# Patient Record
Sex: FEMALE | Race: WHITE | NOT HISPANIC OR LATINO | Employment: OTHER | URBAN - METROPOLITAN AREA
[De-identification: names, ages, dates, MRNs, and addresses within clinical notes are randomized per-mention and may not be internally consistent; named-entity substitution may affect disease eponyms.]

---

## 2023-04-17 ENCOUNTER — OFFICE VISIT (OUTPATIENT)
Dept: PRIMARY CARE | Facility: CLINIC | Age: 68
End: 2023-04-17
Payer: MEDICARE

## 2023-04-17 VITALS
BODY MASS INDEX: 43.4 KG/M2 | DIASTOLIC BLOOD PRESSURE: 72 MMHG | HEART RATE: 80 BPM | WEIGHT: 293 LBS | TEMPERATURE: 97 F | HEIGHT: 69 IN | SYSTOLIC BLOOD PRESSURE: 138 MMHG

## 2023-04-17 DIAGNOSIS — I83.892 VENOUS STASIS ULCER OF LEFT LOWER LEG WITH EDEMA OF LEFT LOWER LEG (MULTI): ICD-10-CM

## 2023-04-17 DIAGNOSIS — M06.4 INFLAMMATORY POLYARTHROPATHY (MULTI): ICD-10-CM

## 2023-04-17 DIAGNOSIS — I83.029 VENOUS STASIS ULCER OF LEFT LOWER LEG WITH EDEMA OF LEFT LOWER LEG (MULTI): ICD-10-CM

## 2023-04-17 DIAGNOSIS — L30.9 DERMATITIS: Primary | ICD-10-CM

## 2023-04-17 DIAGNOSIS — L97.929 VENOUS STASIS ULCER OF LEFT LOWER LEG WITH EDEMA OF LEFT LOWER LEG (MULTI): ICD-10-CM

## 2023-04-17 DIAGNOSIS — E66.01 OBESITY, MORBID (MULTI): ICD-10-CM

## 2023-04-17 DIAGNOSIS — R60.0 VENOUS STASIS ULCER OF LEFT LOWER LEG WITH EDEMA OF LEFT LOWER LEG (MULTI): ICD-10-CM

## 2023-04-17 DIAGNOSIS — D68.51 FACTOR V LEIDEN (MULTI): ICD-10-CM

## 2023-04-17 PROBLEM — K43.9 HERNIA OF ANTERIOR ABDOMINAL WALL: Status: RESOLVED | Noted: 2023-04-17 | Resolved: 2023-04-17

## 2023-04-17 PROBLEM — S69.90XA HAND INJURY: Status: RESOLVED | Noted: 2023-04-17 | Resolved: 2023-04-17

## 2023-04-17 PROBLEM — L03.90 CELLULITIS: Status: RESOLVED | Noted: 2023-04-17 | Resolved: 2023-04-17

## 2023-04-17 PROBLEM — K57.32 DIVERTICULITIS, COLON: Status: RESOLVED | Noted: 2023-04-17 | Resolved: 2023-04-17

## 2023-04-17 PROBLEM — R73.03 PREDIABETES: Status: ACTIVE | Noted: 2023-04-17

## 2023-04-17 PROBLEM — I82.409 RECURRENT DEEP VENOUS THROMBOSIS (MULTI): Status: ACTIVE | Noted: 2023-04-17

## 2023-04-17 PROBLEM — R73.01 FASTING HYPERGLYCEMIA: Status: ACTIVE | Noted: 2023-04-17

## 2023-04-17 PROBLEM — R03.0 ELEVATED BLOOD PRESSURE READING: Status: ACTIVE | Noted: 2023-04-17

## 2023-04-17 PROBLEM — E78.00 HYPERCHOLESTEROLEMIA: Status: RESOLVED | Noted: 2023-04-17 | Resolved: 2023-04-17

## 2023-04-17 PROBLEM — E79.0 HYPERURICEMIA: Status: RESOLVED | Noted: 2023-04-17 | Resolved: 2023-04-17

## 2023-04-17 PROBLEM — M15.0 PRIMARY OSTEOARTHRITIS INVOLVING MULTIPLE JOINTS: Status: ACTIVE | Noted: 2023-04-17

## 2023-04-17 PROBLEM — J40 BRONCHITIS: Status: RESOLVED | Noted: 2023-04-17 | Resolved: 2023-04-17

## 2023-04-17 PROBLEM — E55.9 VITAMIN D DEFICIENCY: Status: ACTIVE | Noted: 2023-04-17

## 2023-04-17 PROBLEM — M25.50 JOINT PAIN: Status: ACTIVE | Noted: 2023-04-17

## 2023-04-17 PROBLEM — M12.9 ARTHROPATHY: Status: RESOLVED | Noted: 2023-04-17 | Resolved: 2023-04-17

## 2023-04-17 PROBLEM — R10.9 ABDOMINAL PAIN: Status: RESOLVED | Noted: 2023-04-17 | Resolved: 2023-04-17

## 2023-04-17 PROBLEM — R35.0 URINE FREQUENCY: Status: RESOLVED | Noted: 2023-04-17 | Resolved: 2023-04-17

## 2023-04-17 PROBLEM — K57.90 DIVERTICULOSIS: Status: RESOLVED | Noted: 2023-04-17 | Resolved: 2023-04-17

## 2023-04-17 PROBLEM — C44.320 SQUAMOUS CELL CARCINOMA OF FACE: Status: ACTIVE | Noted: 2023-04-17

## 2023-04-17 PROBLEM — M15.9 PRIMARY OSTEOARTHRITIS INVOLVING MULTIPLE JOINTS: Status: ACTIVE | Noted: 2023-04-17

## 2023-04-17 PROBLEM — K46.9 ABDOMINAL HERNIA: Status: RESOLVED | Noted: 2023-04-17 | Resolved: 2023-04-17

## 2023-04-17 PROBLEM — E66.9 OBESITY: Status: ACTIVE | Noted: 2023-04-17

## 2023-04-17 PROBLEM — U07.1 COVID-19: Status: RESOLVED | Noted: 2023-04-17 | Resolved: 2023-04-17

## 2023-04-17 PROCEDURE — 4004F PT TOBACCO SCREEN RCVD TLK: CPT | Performed by: FAMILY MEDICINE

## 2023-04-17 PROCEDURE — 99213 OFFICE O/P EST LOW 20 MIN: CPT | Performed by: FAMILY MEDICINE

## 2023-04-17 PROCEDURE — 1159F MED LIST DOCD IN RCRD: CPT | Performed by: FAMILY MEDICINE

## 2023-04-17 PROCEDURE — 1160F RVW MEDS BY RX/DR IN RCRD: CPT | Performed by: FAMILY MEDICINE

## 2023-04-17 RX ORDER — ALLOPURINOL 300 MG/1
1 TABLET ORAL DAILY
COMMUNITY
Start: 2021-09-07 | End: 2023-04-17 | Stop reason: ALTCHOICE

## 2023-04-17 RX ORDER — TRIAMCINOLONE ACETONIDE 1 MG/G
CREAM TOPICAL 2 TIMES DAILY
Qty: 15 G | Refills: 1 | Status: SHIPPED | OUTPATIENT
Start: 2023-04-17

## 2023-04-17 RX ORDER — MELOXICAM 7.5 MG/1
1 TABLET ORAL DAILY
COMMUNITY
Start: 2022-07-19

## 2023-04-17 RX ORDER — WARFARIN SODIUM 5 MG/1
1 TABLET ORAL DAILY
COMMUNITY
Start: 2014-10-02

## 2023-04-17 ASSESSMENT — PATIENT HEALTH QUESTIONNAIRE - PHQ9
1. LITTLE INTEREST OR PLEASURE IN DOING THINGS: NOT AT ALL
SUM OF ALL RESPONSES TO PHQ9 QUESTIONS 1 AND 2: 0
2. FEELING DOWN, DEPRESSED OR HOPELESS: NOT AT ALL

## 2023-04-17 NOTE — PROGRESS NOTES
"Subjective   Patient ID: Becky Ribera is a 67 y.o. female who presents for Rash (Itchy rash on her leg that started Saturday.  Hx of cellulitis and dermatitis.).    HPI 66 yo female presents co right calf itching since sat  No known bites  No warmth or sores or ulcerations  No fevers  Tried an old hydrocortisone crm    hx of eczema    had been seeing  wound clinic for venous stasis ucler of left lower leg and hx of lymphedema but hasnt seen for over a yr and left leg seems ok.     She is nervous bc she did have to be on a PICC line in past from cellulitis    hx of several DVTs in the past in left leg  hx of factor V leiden    is on coumadin and gets managed through coumadin clinic at [facility].     +tob    Due for fu  Has been caring for her sick mom  Denies any chest pains or palps or sob    Review of Systems  ROS as stated in HPI  Objective   /72   Pulse 80   Temp 36.1 °C (97 °F)   Ht 1.753 m (5' 9\")   Wt 150 kg (330 lb)   BMI 48.73 kg/m²     Physical Exam  Constitutional: A&O; NAD  Ext:  right posterior calf with small area of erythema and dryness;+chronic LE edema; no calf tenderness; no warmth; no open sores or ulcerations. No streaking    Psych: Judgment, orientation, mood, affect, insight wnl   Assessment/Plan   Problem List Items Addressed This Visit          Musculoskeletal    Venous stasis ulcer of left lower leg with edema of left lower leg (CMS/HCC)    Inflammatory polyarthropathy (CMS/HCC)       Endocrine/Metabolic    Obesity, morbid (CMS/HCC)       Hematologic    Factor V Leiden (CMS/HCC)    Relevant Medications    warfarin (Coumadin) 5 mg tablet     Other Visit Diagnoses       Dermatitis    -  Primary    Relevant Medications    triamcinolone (Kenalog) 0.1 % cream          Try rx triamcinolone crm prn  Keep clean  Watch for any redness, warmth, streaking, fevers or chills  Fu for wellness visit and labs.     "

## 2023-04-21 ENCOUNTER — PATIENT OUTREACH (OUTPATIENT)
Dept: CARE COORDINATION | Facility: CLINIC | Age: 68
End: 2023-04-21

## 2023-08-30 ENCOUNTER — TELEPHONE (OUTPATIENT)
Dept: PRIMARY CARE | Facility: CLINIC | Age: 68
End: 2023-08-30
Payer: MEDICARE

## 2023-09-14 ENCOUNTER — TELEPHONE (OUTPATIENT)
Dept: PRIMARY CARE | Facility: CLINIC | Age: 68
End: 2023-09-14
Payer: MEDICARE

## 2023-09-14 NOTE — TELEPHONE ENCOUNTER
Pt called and said she had a tick and went to the wound clinic and they prescribed her doxycline.  She said if something bothers her on it she will call.  I also informed her she is due for her mcw and she stated she got the vm and will call when she wants to schedule her appt.

## 2023-10-11 DIAGNOSIS — E79.0 URICACIDEMIA: ICD-10-CM

## 2023-10-11 RX ORDER — ALLOPURINOL 300 MG/1
300 TABLET ORAL DAILY
COMMUNITY
End: 2023-10-11 | Stop reason: SDUPTHER

## 2023-10-11 RX ORDER — MELOXICAM 7.5 MG/1
7.5 TABLET ORAL
COMMUNITY
Start: 2022-03-10

## 2023-10-11 RX ORDER — ALLOPURINOL 300 MG/1
300 TABLET ORAL DAILY
Qty: 30 TABLET | Refills: 0 | Status: SHIPPED | OUTPATIENT
Start: 2023-10-11 | End: 2023-11-07 | Stop reason: SDUPTHER

## 2023-10-11 RX ORDER — DOXYCYCLINE 100 MG/1
100 CAPSULE ORAL 2 TIMES DAILY
COMMUNITY
End: 2024-05-21 | Stop reason: WASHOUT

## 2023-11-06 PROBLEM — L97.309 CHRONIC ULCER OF ANKLE (MULTI): Status: ACTIVE | Noted: 2023-11-06

## 2023-11-06 PROBLEM — I87.2 VENOUS STASIS DERMATITIS: Status: ACTIVE | Noted: 2023-11-06

## 2023-11-06 PROBLEM — M10.9 GOUTY ARTHROPATHY: Status: ACTIVE | Noted: 2023-11-06

## 2023-11-06 PROBLEM — I87.009 POST-PHLEBITIC SYNDROME: Status: ACTIVE | Noted: 2023-11-06

## 2023-11-06 PROBLEM — I89.0 LYMPHEDEMA OF EXTREMITY: Status: ACTIVE | Noted: 2023-11-06

## 2023-11-06 PROBLEM — M06.9 RA (RHEUMATOID ARTHRITIS) (MULTI): Status: ACTIVE | Noted: 2023-11-06

## 2023-11-06 RX ORDER — WARFARIN 2.5 MG/1
2.5 TABLET ORAL
COMMUNITY

## 2023-11-06 RX ORDER — FLUTICASONE PROPIONATE 220 UG/1
1 AEROSOL, METERED RESPIRATORY (INHALATION) 2 TIMES DAILY PRN
COMMUNITY
Start: 2019-08-14

## 2023-11-06 RX ORDER — BETAMETHASONE DIPROPIONATE 0.5 MG/G
OINTMENT TOPICAL NIGHTLY
COMMUNITY
Start: 2018-12-17

## 2023-11-06 RX ORDER — BETAMETHASONE DIPROPIONATE 0.5 MG/G
OINTMENT, AUGMENTED TOPICAL 2 TIMES DAILY
COMMUNITY
Start: 2022-12-12

## 2023-11-06 RX ORDER — CYCLOBENZAPRINE HCL 10 MG
1 TABLET ORAL 2 TIMES DAILY PRN
COMMUNITY
Start: 2020-10-23

## 2023-11-06 RX ORDER — FLUTICASONE PROPIONATE 50 MCG
1 SPRAY, SUSPENSION (ML) NASAL DAILY
COMMUNITY
Start: 2016-12-08

## 2023-11-06 RX ORDER — WARFARIN 10 MG/1
1 TABLET ORAL 3 TIMES WEEKLY
COMMUNITY
Start: 2019-08-14

## 2023-11-06 RX ORDER — WARFARIN SODIUM 5 MG/1
2.5 TABLET ORAL
COMMUNITY
Start: 2019-08-14

## 2023-11-06 RX ORDER — ALBUTEROL SULFATE 90 UG/1
AEROSOL, METERED RESPIRATORY (INHALATION)
COMMUNITY
Start: 2016-12-08

## 2023-11-06 RX ORDER — CIPROFLOXACIN 500 MG/1
500 TABLET ORAL EVERY 12 HOURS
COMMUNITY
Start: 2020-10-13 | End: 2024-05-21 | Stop reason: WASHOUT

## 2023-11-06 RX ORDER — ERGOCALCIFEROL 1.25 MG/1
1 CAPSULE ORAL WEEKLY
COMMUNITY
Start: 2021-04-08 | End: 2024-05-21 | Stop reason: WASHOUT

## 2023-11-06 RX ORDER — ACETAMINOPHEN AND CODEINE PHOSPHATE 300; 30 MG/1; MG/1
1 TABLET ORAL EVERY 6 HOURS
COMMUNITY
Start: 2020-08-27 | End: 2024-05-21 | Stop reason: WASHOUT

## 2023-11-07 ENCOUNTER — LAB (OUTPATIENT)
Dept: LAB | Facility: LAB | Age: 68
End: 2023-11-07
Payer: MEDICARE

## 2023-11-07 ENCOUNTER — OFFICE VISIT (OUTPATIENT)
Dept: RHEUMATOLOGY | Facility: CLINIC | Age: 68
End: 2023-11-07
Payer: MEDICARE

## 2023-11-07 VITALS
HEIGHT: 69 IN | DIASTOLIC BLOOD PRESSURE: 76 MMHG | WEIGHT: 293 LBS | BODY MASS INDEX: 43.4 KG/M2 | HEART RATE: 66 BPM | SYSTOLIC BLOOD PRESSURE: 160 MMHG

## 2023-11-07 DIAGNOSIS — M10.9 GOUTY ARTHROPATHY: Primary | ICD-10-CM

## 2023-11-07 DIAGNOSIS — M15.9 PRIMARY OSTEOARTHRITIS INVOLVING MULTIPLE JOINTS: ICD-10-CM

## 2023-11-07 DIAGNOSIS — M10.9 GOUTY ARTHROPATHY: ICD-10-CM

## 2023-11-07 PROCEDURE — 4004F PT TOBACCO SCREEN RCVD TLK: CPT | Performed by: INTERNAL MEDICINE

## 2023-11-07 PROCEDURE — 84550 ASSAY OF BLOOD/URIC ACID: CPT

## 2023-11-07 PROCEDURE — 1159F MED LIST DOCD IN RCRD: CPT | Performed by: INTERNAL MEDICINE

## 2023-11-07 PROCEDURE — 99213 OFFICE O/P EST LOW 20 MIN: CPT | Performed by: INTERNAL MEDICINE

## 2023-11-07 PROCEDURE — 80048 BASIC METABOLIC PNL TOTAL CA: CPT

## 2023-11-07 PROCEDURE — 36415 COLL VENOUS BLD VENIPUNCTURE: CPT

## 2023-11-07 PROCEDURE — 1160F RVW MEDS BY RX/DR IN RCRD: CPT | Performed by: INTERNAL MEDICINE

## 2023-11-07 PROCEDURE — 1126F AMNT PAIN NOTED NONE PRSNT: CPT | Performed by: INTERNAL MEDICINE

## 2023-11-07 RX ORDER — ALLOPURINOL 300 MG/1
300 TABLET ORAL DAILY
Qty: 90 TABLET | Refills: 1 | Status: SHIPPED | OUTPATIENT
Start: 2023-11-07 | End: 2024-02-05

## 2023-11-07 NOTE — PROGRESS NOTES
Follow-up Rheumatology Patient Visit    Chief Complaint:  Becky Ribera is a 68 y.o. female presenting today for Follow-up.    History of Presenting Problem:   67 y/o female with prior diagnosis of RA present for evaluation. She is on Coumadin due to Factor 5 Leiden, She recalls that she initially though to have a connective tissue disease and then diagnosis with RA. She report she saw 3 different rheumatologist who ended up treating her with Plaquenil and then MTX 3 pills weekly, she report she decided to stop medication as she did not feel the medication did any good on them and she was feeling well.    Today she report her joints have been doing pretty good since being on the allopurinol that she only takes meloxicam as needed very rarely she recently lost her mother and is going to going through bereavement counseling.  She does admit to smoking we will also work on quitting after she gets through the recent stress of losing her mother and dealing with estate planning    Problem List:   Patient Active Problem List   Diagnosis    Factor 5 Leiden mutation, heterozygous (CMS/HCC)    Prediabetes    Joint pain    Obesity, morbid (CMS/HCC)    Fasting hyperglycemia    Elevated blood pressure reading    Recurrent deep venous thrombosis (CMS/HCC)    Primary osteoarthritis involving multiple joints    Squamous cell carcinoma of face    Vitamin D deficiency    Venous stasis ulcer of left lower leg with edema of left lower leg (CMS/HCC)    Inflammatory polyarthropathy (CMS/HCC)    Chronic ulcer of ankle (CMS/HCC)    Gouty arthropathy    Lymphedema of extremity    Post-phlebitic syndrome    Primary localized osteoarthrosis, lower leg    VTE (venous thromboembolism)    Venous stasis dermatitis    Activated protein C resistance (CMS/HCC)    Homozygous Factor V Leiden mutation (CMS/HCC)    RA (rheumatoid arthritis) (CMS/HCC)       Past Medical History:   Past Medical History:   Diagnosis Date    Abdominal hernia 04/17/2023     Abdominal pain 04/17/2023    Arthropathy 04/17/2023    Cellulitis 04/17/2023    COVID-19 04/17/2023    Dermatitis, eczematoid 04/17/2023    Dislocation of jaw, unspecified side, initial encounter     Dislocation, jaw    Diverticulitis, colon 04/17/2023    Diverticulosis 04/17/2023    Encounter for immunization 12/10/2015    Need for prophylactic vaccination and inoculation against influenza    Encounter for immunization 12/10/2015    Need for vaccination for pneumococcus    Encounter for screening for malignant neoplasm of colon 10/04/2021    Screening for colon cancer    Hand injury 04/17/2023    Hernia of anterior abdominal wall 04/17/2023    Hyperuricemia 04/17/2023    Long term (current) use of antimetabolite agent 02/04/2015    Long term methotrexate user    Long term (current) use of unspecified immunomodulators and immunosuppressants 07/06/2014    Long-term use of immunosuppressant medication    Personal history of (healed) traumatic fracture     History of fracture of ankle    Personal history of (healed) traumatic fracture     History of fracture of finger    Personal history of diseases of the blood and blood-forming organs and certain disorders involving the immune mechanism     History of anemia    Personal history of diseases of the blood and blood-forming organs and certain disorders involving the immune mechanism     History of factor V Leiden mutation    Personal history of diseases of the skin and subcutaneous tissue     History of rosacea    Personal history of other diseases of the digestive system 06/14/2016    History of colitis    Personal history of other diseases of the musculoskeletal system and connective tissue 04/08/2014    History of osteoarthritis    Personal history of other diseases of the nervous system and sense organs 09/23/2014    History of otitis media    Personal history of other diseases of the respiratory system     History of pleural effusion    Personal history of other  diseases of the respiratory system 04/08/2014    History of acute bronchitis    Personal history of pneumonia (recurrent)     History of pneumonia    Rash and other nonspecific skin eruption 04/10/2014    Rash    Urine frequency 04/17/2023       Surgical History:   Past Surgical History:   Procedure Laterality Date    CARPAL TUNNEL RELEASE  12/10/2015    Neuroplasty Decompression Median Nerve At Carpal Tunnel    OTHER SURGICAL HISTORY  12/10/2015    Wrist Excision Of Ganglion    OTHER SURGICAL HISTORY  02/04/2021    Lung surgery    OTHER SURGICAL HISTORY  02/10/2014    Chest Tube Insertion With Chemical Pleurodesis (Non-chemotherapeutic)    OTHER SURGICAL HISTORY  01/17/2014    Chemosurgery (Mohs Micrographic Technique)    TONSILLECTOMY  12/10/2015    Tonsillectomy With Adenoidectomy        Allergies:   Allergies   Allergen Reactions    Adhesive Tape-Silicones Hives and Rash     form of cephalosporin    Cefuroxime Sodium Hives and Rash     form of cephalosporin    Cefadroxil Unknown    Bacitracin Rash    Cefuroxime Rash     All over body   All over body   All over body    All over body    All over body   All over body    Replaced free text allergyAdded as Codified    Celecoxib Hives and Rash    Retapamulin Rash    Rofecoxib Hives and Rash     VIOXX--HIVES    Replaced free text allergyAdded as Codified    Rosin Rash    Sulfa (Sulfonamide Antibiotics) Hives and Rash     Very sensitive       Medications:   Current Outpatient Medications:     acetaminophen-codeine (Tylenol w/ Codeine #3) 300-30 mg tablet, Take 1 tablet by mouth every 6 hours., Disp: , Rfl:     albuterol (ProAir HFA) 90 mcg/actuation inhaler, Inhale., Disp: , Rfl:     allopurinol (Zyloprim) 300 mg tablet, Take 1 tablet (300 mg) by mouth once daily., Disp: 90 tablet, Rfl: 1    betamethasone dipropionate (Diprosone) 0.05 % ointment, Apply topically once daily at bedtime. Apply to rash on hands as directed, Disp: , Rfl:     betamethasone, augmented,  "(Diprolene) 0.05 % ointment, Apply topically twice a day. Apply to affected area. APPLY TO RASH, Disp: , Rfl:     ciprofloxacin (Cipro) 500 mg tablet, Take 1 tablet (500 mg) by mouth every 12 hours., Disp: , Rfl:     cyclobenzaprine (Flexeril) 10 mg tablet, Take 1 tablet (10 mg) by mouth 2 times a day as needed for muscle spasms., Disp: , Rfl:     diclofenac sodium 1 % kit, Apply 2 g topically 3 times a day., Disp: , Rfl:     diphenhydrAMINE-acetaminophen 12.5-325 mg tablet, Take 1 tablet by mouth once daily at bedtime., Disp: , Rfl:     doxycycline (Vibramycin) 100 mg capsule, Take 1 capsule (100 mg) by mouth 2 times a day., Disp: , Rfl:     ergocalciferol (Vitamin D-2) 1.25 MG (93484 UT) capsule, Take 1 capsule (1,250 mcg) by mouth once a week., Disp: , Rfl:     fluticasone (Flonase) 50 mcg/actuation nasal spray, Administer 1 spray into each nostril once daily., Disp: , Rfl:     fluticasone (Flovent HFA) 220 mcg/actuation inhaler, Inhale 1 puff 2 times a day as needed., Disp: , Rfl:     meloxicam (Mobic) 7.5 mg tablet, Take 1 tablet (7.5 mg) by mouth once daily., Disp: , Rfl:     meloxicam (Mobic) 7.5 mg tablet, Take 1 tablet (7.5 mg) by mouth., Disp: , Rfl:     triamcinolone (Kenalog) 0.1 % cream, Apply topically 2 times a day. Apply to affected area 1-2 times daily as needed., Disp: 15 g, Rfl: 1    warfarin (Coumadin) 10 mg tablet, Take 1 tablet (10 mg) by mouth 3 times a week. M, W, F. Take at night., Disp: , Rfl:     warfarin (Coumadin) 2.5 mg tablet, Take 1 tablet (2.5 mg) by mouth once daily.  Take with 10 mg tablet for total daily dose of 12.5 mg., Disp: , Rfl:     warfarin (Coumadin) 5 mg tablet, Take 1 tablet (5 mg) by mouth once daily., Disp: , Rfl:     warfarin (Coumadin) 5 mg tablet, Take 2.5 tablets (12.5 mg) by mouth. Browne,Tu,Th,Sa, TAKE AT NIGHT, Disp: , Rfl:       Objective   Physical Examination:    Visit Vitals  /76   Pulse 66   Ht 1.753 m (5' 9\")   Wt (!) 156 kg (343 lb)   BMI 50.65 kg/m² "   Smoking Status Every Day   BSA 2.76 m²        Gen: NAD, A&O x 3  HEENT: clear sclera and conjunctiva,     Musculoskeletal:   Neck; WNL, full ROM  Shoulder: WNL, full ROM  Elbow:WNL, full ROM, no effusion noted  Wrist and fingers;no active synovitis noted, Full ROM in the Wrist , Good fist and   Knees:  No effusions or crepitation, full ROM.  Hips; WNL, full ROM, Negative Daniel test  Ankle, Feet; WNL, full ROM    Skin: No rashes or lesions seen, no nail changes  Neuro: A&O x3, Normal Gait    Procedures :None    Orders:  Orders Placed This Encounter   Procedures    Uric Acid    Basic Metabolic Panel        Provider Impression:   Assessment/Plan   Encounter Diagnoses   Name Primary?    Gouty arthropathy Yes    Primary osteoarthritis involving multiple joints           68-year-old female with previous history of rheumatoid arthritis diagnosis treated with methotrexate 3 pills weekly presents for evaluation she apparently stopped treatment 8 years ago and was doing well until couple months ago after she recalls lifting a toddler and having pain in her neck shoulder and wrist area. After work up Determine she has underlying Gout arthropathy   -Continue with allopurinol 300 mg daily,   -Continue meloxicam 7.5 mg or Tylenol as needed  -Continue with with Vitamin D supplements  -We will check BMP and uric acid levels  -f/u in 6 months

## 2023-11-08 LAB
ANION GAP SERPL CALC-SCNC: 12 MMOL/L (ref 10–20)
BUN SERPL-MCNC: 12 MG/DL (ref 6–23)
CALCIUM SERPL-MCNC: 9.8 MG/DL (ref 8.6–10.6)
CHLORIDE SERPL-SCNC: 102 MMOL/L (ref 98–107)
CO2 SERPL-SCNC: 30 MMOL/L (ref 21–32)
CREAT SERPL-MCNC: 0.84 MG/DL (ref 0.5–1.05)
GFR SERPL CREATININE-BSD FRML MDRD: 76 ML/MIN/1.73M*2
GLUCOSE SERPL-MCNC: 113 MG/DL (ref 74–99)
POTASSIUM SERPL-SCNC: 4.3 MMOL/L (ref 3.5–5.3)
SODIUM SERPL-SCNC: 140 MMOL/L (ref 136–145)
URATE SERPL-MCNC: 5.6 MG/DL (ref 2.3–6.7)

## 2023-11-20 ENCOUNTER — OFFICE VISIT (OUTPATIENT)
Dept: URGENT CARE | Facility: CLINIC | Age: 68
End: 2023-11-20
Payer: MEDICARE

## 2023-11-20 VITALS
HEIGHT: 68 IN | WEIGHT: 293 LBS | SYSTOLIC BLOOD PRESSURE: 174 MMHG | BODY MASS INDEX: 44.41 KG/M2 | TEMPERATURE: 98.3 F | HEART RATE: 73 BPM | OXYGEN SATURATION: 97 % | DIASTOLIC BLOOD PRESSURE: 95 MMHG | RESPIRATION RATE: 18 BRPM

## 2023-11-20 DIAGNOSIS — J01.90 ACUTE SINUSITIS, RECURRENCE NOT SPECIFIED, UNSPECIFIED LOCATION: Primary | ICD-10-CM

## 2023-11-20 PROCEDURE — 1160F RVW MEDS BY RX/DR IN RCRD: CPT | Performed by: PHYSICIAN ASSISTANT

## 2023-11-20 PROCEDURE — 1159F MED LIST DOCD IN RCRD: CPT | Performed by: PHYSICIAN ASSISTANT

## 2023-11-20 PROCEDURE — 4004F PT TOBACCO SCREEN RCVD TLK: CPT | Performed by: PHYSICIAN ASSISTANT

## 2023-11-20 PROCEDURE — 1125F AMNT PAIN NOTED PAIN PRSNT: CPT | Performed by: PHYSICIAN ASSISTANT

## 2023-11-20 PROCEDURE — 99203 OFFICE O/P NEW LOW 30 MIN: CPT | Performed by: PHYSICIAN ASSISTANT

## 2023-11-20 RX ORDER — DOXYCYCLINE 100 MG/1
100 CAPSULE ORAL 2 TIMES DAILY
Qty: 20 CAPSULE | Refills: 0 | Status: SHIPPED | OUTPATIENT
Start: 2023-11-20 | End: 2023-11-30

## 2023-11-20 RX ORDER — BENZONATATE 100 MG/1
100 CAPSULE ORAL 3 TIMES DAILY PRN
Qty: 30 CAPSULE | Refills: 0 | Status: SHIPPED | OUTPATIENT
Start: 2023-11-20 | End: 2024-05-21 | Stop reason: WASHOUT

## 2023-11-20 ASSESSMENT — PAIN SCALES - GENERAL: PAINLEVEL: 2

## 2023-11-21 PROBLEM — J01.90 ACUTE SINUSITIS: Status: ACTIVE | Noted: 2023-11-21

## 2023-11-21 ASSESSMENT — ENCOUNTER SYMPTOMS
COUGH: 1
SINUS PRESSURE: 1

## 2023-11-22 NOTE — PROGRESS NOTES
Subjective   Patient ID: Becky Ribera is a 68 y.o. female.    Patient is a 68-year-old female who complains of ongoing congestion, sinus pressure and cough that she has been experiencing for the past 2 weeks.  Patient reports no associated fever, chills or myalgia.  Patient has no history of asthma or COPD and does not smoke.      Cough    Sinusitis  Associated symptoms: congestion and cough      The following portions of the chart were reviewed this encounter and updated as appropriate:       Review of Systems   HENT:  Positive for congestion and sinus pressure.    Respiratory:  Positive for cough.    All other systems reviewed and are negative.    Objective   Physical Exam  Vitals and nursing note reviewed.   Constitutional:       Appearance: Normal appearance. She is normal weight.   HENT:      Head: Normocephalic and atraumatic.      Right Ear: Tympanic membrane, ear canal and external ear normal.      Left Ear: Tympanic membrane, ear canal and external ear normal.      Nose: Nose normal. No congestion or rhinorrhea.      Mouth/Throat:      Mouth: Mucous membranes are moist.      Pharynx: Oropharynx is clear. No oropharyngeal exudate or posterior oropharyngeal erythema.   Eyes:      Extraocular Movements: Extraocular movements intact.      Conjunctiva/sclera: Conjunctivae normal.      Pupils: Pupils are equal, round, and reactive to light.   Cardiovascular:      Rate and Rhythm: Normal rate and regular rhythm.      Pulses: Normal pulses.      Heart sounds: Normal heart sounds.   Pulmonary:      Effort: Pulmonary effort is normal. No respiratory distress.      Breath sounds: Normal breath sounds. No stridor. No wheezing, rhonchi or rales.   Musculoskeletal:      Cervical back: Normal range of motion and neck supple.   Skin:     General: Skin is warm and dry.      Capillary Refill: Capillary refill takes less than 2 seconds.   Neurological:      General: No focal deficit present.      Mental Status: She is  alert and oriented to person, place, and time.   Psychiatric:         Mood and Affect: Mood normal.         Behavior: Behavior normal.         Thought Content: Thought content normal.         Judgment: Judgment normal.     Assessment/Plan   Physical exam findings as noted above.  Patient has multiple antibiotic allergies, however she reports that she has taken doxycycline in the past with no adverse effects.  Patient was provided with prescriptions for doxycycline 100 mg and Tessalon 100 mg.  Supportive care instructions were discussed and the patient verbalizes good understanding of same.    CLINICAL IMPRESSION:  Acute Sinusitis    Diagnoses and all orders for this visit:  Acute sinusitis, recurrence not specified, unspecified location  -     benzonatate (Tessalon Perles) 100 mg capsule; Take 1 capsule (100 mg) by mouth 3 times a day as needed for cough.  -     doxycycline (Monodox) 100 mg capsule; Take 1 capsule (100 mg) by mouth 2 times a day for 10 days. Take with at least 8 ounces (large glass) of water, do not lie down for 30 minutes after

## 2024-04-28 ENCOUNTER — OFFICE VISIT (OUTPATIENT)
Dept: URGENT CARE | Facility: CLINIC | Age: 69
End: 2024-04-28
Payer: MEDICARE

## 2024-04-28 VITALS
HEART RATE: 84 BPM | SYSTOLIC BLOOD PRESSURE: 172 MMHG | DIASTOLIC BLOOD PRESSURE: 82 MMHG | RESPIRATION RATE: 20 BRPM | OXYGEN SATURATION: 93 % | TEMPERATURE: 97.1 F

## 2024-04-28 DIAGNOSIS — J01.90 ACUTE SINUSITIS, RECURRENCE NOT SPECIFIED, UNSPECIFIED LOCATION: Primary | ICD-10-CM

## 2024-04-28 PROCEDURE — 1125F AMNT PAIN NOTED PAIN PRSNT: CPT | Performed by: PHYSICIAN ASSISTANT

## 2024-04-28 PROCEDURE — 99213 OFFICE O/P EST LOW 20 MIN: CPT | Performed by: PHYSICIAN ASSISTANT

## 2024-04-28 RX ORDER — BENZONATATE 100 MG/1
100 CAPSULE ORAL 3 TIMES DAILY PRN
Qty: 30 CAPSULE | Refills: 0 | Status: SHIPPED | OUTPATIENT
Start: 2024-04-28 | End: 2024-05-08

## 2024-04-28 RX ORDER — AMOXICILLIN AND CLAVULANATE POTASSIUM 875; 125 MG/1; MG/1
1 TABLET, FILM COATED ORAL 2 TIMES DAILY
Qty: 20 TABLET | Refills: 0 | Status: SHIPPED | OUTPATIENT
Start: 2024-04-28 | End: 2024-05-08

## 2024-04-28 RX ORDER — PREDNISONE 20 MG/1
20 TABLET ORAL 2 TIMES DAILY
Qty: 6 TABLET | Refills: 0 | Status: SHIPPED | OUTPATIENT
Start: 2024-04-28 | End: 2024-05-01

## 2024-04-28 ASSESSMENT — PAIN SCALES - GENERAL: PAINLEVEL: 7

## 2024-04-28 ASSESSMENT — ENCOUNTER SYMPTOMS
SINUS COMPLAINT: 1
SINUS PRESSURE: 1
COUGH: 1
SINUS PAIN: 1

## 2024-04-28 NOTE — PROGRESS NOTES
Subjective   Patient ID: Becky Ribera is a 69 y.o. female.    Patient is a 69-year-old female complains of worsening congestion, sinus pressure, ear pain and cough that she has been experiencing for the past 1 week.  Patient reports increasing fullness and pressure to her bilateral ears.  Patient denies fever, chills or myalgia.  Patient denies history of asthma or COPD but does smoke.  Patient states that she has a significant history of both bronchitis and pneumonia.  Patient denies dyspnea or shortness of breath.      Sinus Problem  Associated symptoms: congestion, cough and ear pain    The following portions of the chart were reviewed this encounter and updated as appropriate:       Review of Systems   HENT:  Positive for congestion, ear pain, sinus pressure and sinus pain.    Respiratory:  Positive for cough.    All other systems reviewed and are negative.  Objective   Physical Exam  Vitals and nursing note reviewed.   Constitutional:       Appearance: Normal appearance. She is normal weight.   HENT:      Head: Normocephalic and atraumatic.      Right Ear: Tympanic membrane, ear canal and external ear normal.      Left Ear: Tympanic membrane, ear canal and external ear normal.      Nose: Nose normal. No congestion or rhinorrhea.      Mouth/Throat:      Mouth: Mucous membranes are moist.      Pharynx: Oropharynx is clear. No oropharyngeal exudate or posterior oropharyngeal erythema.   Eyes:      Extraocular Movements: Extraocular movements intact.      Conjunctiva/sclera: Conjunctivae normal.      Pupils: Pupils are equal, round, and reactive to light.   Cardiovascular:      Rate and Rhythm: Normal rate and regular rhythm.      Pulses: Normal pulses.      Heart sounds: Normal heart sounds.   Pulmonary:      Effort: Pulmonary effort is normal. No respiratory distress.      Breath sounds: Normal breath sounds. No stridor. No wheezing, rhonchi or rales.   Musculoskeletal:      Cervical back: Normal range of  motion and neck supple.   Skin:     General: Skin is warm and dry.      Capillary Refill: Capillary refill takes less than 2 seconds.   Neurological:      General: No focal deficit present.      Mental Status: She is alert and oriented to person, place, and time.   Psychiatric:         Mood and Affect: Mood normal.         Behavior: Behavior normal.         Thought Content: Thought content normal.         Judgment: Judgment normal.     Assessment/Plan   Physical exam findings as noted above.  Patient states that she can take amoxicillin and penicillin without difficulty even though she does have multiple cephalosporin allergies.  Patient was provided with prescriptions for Augmentin 875-125 mg, prednisone 20 mg and Tessalon 100 mg.  Supportive care instructions were discussed and the patient verbalizes good understanding of same.    CLINICAL IMPRESSION:  Acute Sinusitis    Diagnoses and all orders for this visit:  Acute sinusitis, recurrence not specified, unspecified location  -     amoxicillin-pot clavulanate (Augmentin) 875-125 mg tablet; Take 1 tablet by mouth 2 times a day for 10 days.  -     benzonatate (Tessalon Perles) 100 mg capsule; Take 1 capsule (100 mg) by mouth 3 times a day as needed for cough for up to 10 days.  -     predniSONE (Deltasone) 20 mg tablet; Take 1 tablet (20 mg) by mouth 2 times a day for 3 days.    Patient disposition: Home

## 2024-05-07 ENCOUNTER — APPOINTMENT (OUTPATIENT)
Dept: RHEUMATOLOGY | Facility: CLINIC | Age: 69
End: 2024-05-07
Payer: MEDICARE

## 2024-05-07 DIAGNOSIS — M10.9 GOUTY ARTHROPATHY: Primary | ICD-10-CM

## 2024-05-07 RX ORDER — ALLOPURINOL 300 MG/1
300 TABLET ORAL DAILY
Qty: 14 TABLET | Refills: 0 | Status: SHIPPED | OUTPATIENT
Start: 2024-05-07 | End: 2024-05-21 | Stop reason: SDUPTHER

## 2024-05-07 RX ORDER — ALLOPURINOL 300 MG/1
TABLET ORAL DAILY
COMMUNITY
Start: 2024-02-19 | End: 2024-05-07 | Stop reason: SDUPTHER

## 2024-05-21 ENCOUNTER — LAB (OUTPATIENT)
Dept: LAB | Facility: LAB | Age: 69
End: 2024-05-21
Payer: MEDICARE

## 2024-05-21 ENCOUNTER — OFFICE VISIT (OUTPATIENT)
Dept: RHEUMATOLOGY | Facility: CLINIC | Age: 69
End: 2024-05-21
Payer: MEDICARE

## 2024-05-21 VITALS
WEIGHT: 293 LBS | SYSTOLIC BLOOD PRESSURE: 137 MMHG | HEIGHT: 68 IN | BODY MASS INDEX: 44.41 KG/M2 | HEART RATE: 77 BPM | DIASTOLIC BLOOD PRESSURE: 74 MMHG

## 2024-05-21 DIAGNOSIS — M10.9 GOUTY ARTHROPATHY: ICD-10-CM

## 2024-05-21 DIAGNOSIS — E55.9 VITAMIN D DEFICIENCY: ICD-10-CM

## 2024-05-21 DIAGNOSIS — M15.9 PRIMARY OSTEOARTHRITIS INVOLVING MULTIPLE JOINTS: Primary | ICD-10-CM

## 2024-05-21 LAB
25(OH)D3 SERPL-MCNC: 20 NG/ML (ref 30–100)
ALBUMIN SERPL BCP-MCNC: 3.8 G/DL (ref 3.4–5)
ALP SERPL-CCNC: 61 U/L (ref 33–136)
ALT SERPL W P-5'-P-CCNC: 19 U/L (ref 7–45)
ANION GAP SERPL CALC-SCNC: 14 MMOL/L (ref 10–20)
AST SERPL W P-5'-P-CCNC: 18 U/L (ref 9–39)
BILIRUB SERPL-MCNC: 0.5 MG/DL (ref 0–1.2)
BUN SERPL-MCNC: 13 MG/DL (ref 6–23)
CALCIUM SERPL-MCNC: 9 MG/DL (ref 8.6–10.6)
CHLORIDE SERPL-SCNC: 104 MMOL/L (ref 98–107)
CO2 SERPL-SCNC: 26 MMOL/L (ref 21–32)
CREAT SERPL-MCNC: 0.77 MG/DL (ref 0.5–1.05)
EGFRCR SERPLBLD CKD-EPI 2021: 84 ML/MIN/1.73M*2
GLUCOSE SERPL-MCNC: 163 MG/DL (ref 74–99)
POTASSIUM SERPL-SCNC: 4.2 MMOL/L (ref 3.5–5.3)
PROT SERPL-MCNC: 7.1 G/DL (ref 6.4–8.2)
SODIUM SERPL-SCNC: 140 MMOL/L (ref 136–145)
URATE SERPL-MCNC: 5.1 MG/DL (ref 2.3–6.7)

## 2024-05-21 PROCEDURE — 36415 COLL VENOUS BLD VENIPUNCTURE: CPT

## 2024-05-21 PROCEDURE — 1126F AMNT PAIN NOTED NONE PRSNT: CPT | Performed by: INTERNAL MEDICINE

## 2024-05-21 PROCEDURE — 82306 VITAMIN D 25 HYDROXY: CPT

## 2024-05-21 PROCEDURE — 1159F MED LIST DOCD IN RCRD: CPT | Performed by: INTERNAL MEDICINE

## 2024-05-21 PROCEDURE — 1160F RVW MEDS BY RX/DR IN RCRD: CPT | Performed by: INTERNAL MEDICINE

## 2024-05-21 PROCEDURE — 99214 OFFICE O/P EST MOD 30 MIN: CPT | Performed by: INTERNAL MEDICINE

## 2024-05-21 PROCEDURE — 84550 ASSAY OF BLOOD/URIC ACID: CPT

## 2024-05-21 PROCEDURE — 80053 COMPREHEN METABOLIC PANEL: CPT

## 2024-05-21 RX ORDER — ALLOPURINOL 300 MG/1
300 TABLET ORAL DAILY
Qty: 90 TABLET | Refills: 1 | Status: SHIPPED | OUTPATIENT
Start: 2024-05-21 | End: 2024-08-19

## 2024-05-21 ASSESSMENT — PAIN SCALES - GENERAL: PAINLEVEL: 0-NO PAIN

## 2024-05-21 NOTE — PROGRESS NOTES
Follow-up Rheumatology Patient Visit    Chief Complaint:  Becky Ribera is a 69 y.o. female presenting today for 6 month fuv.    History of Presenting Problem:   68 y/o female with Gout present for evaluation. She is on Coumadin due to Factor 5 Leiden, She recalls that she initially though to have a connective tissue disease and then diagnosis with RA. She report she saw 3 different rheumatologist who ended up treating her with Plaquenil and then MTX 3 pills weekly, she report she decided to stop medication as she did not feel the medication did any good on them.  After she establish care here she was diagnosed with gout and has been doing well on this treatment  Today she report her joints have been doing pretty good since being on the allopurinol.  she has not needed to take meloxicam for a whole year.  Problem List:   Patient Active Problem List   Diagnosis    Factor 5 Leiden mutation, heterozygous (Multi)    Prediabetes    Joint pain    Obesity, morbid (Multi)    Fasting hyperglycemia    Elevated blood pressure reading    Recurrent deep venous thrombosis (Multi)    Primary osteoarthritis involving multiple joints    Squamous cell carcinoma of face    Vitamin D deficiency    Venous stasis ulcer of left lower leg with edema of left lower leg (Multi)    Inflammatory polyarthropathy (Multi)    Chronic ulcer of ankle (Multi)    Gouty arthropathy    Lymphedema of extremity    Post-phlebitic syndrome    Primary localized osteoarthrosis, lower leg    VTE (venous thromboembolism)    Venous stasis dermatitis    Activated protein C resistance (Multi)    Homozygous Factor V Leiden mutation (Multi)    RA (rheumatoid arthritis) (Multi)    Acute sinusitis       Past Medical History:   Past Medical History:   Diagnosis Date    Abdominal hernia 04/17/2023    Abdominal pain 04/17/2023    Arthropathy 04/17/2023    Cellulitis 04/17/2023    COVID-19 04/17/2023    Dermatitis, eczematoid 04/17/2023    Dislocation of jaw, unspecified  side, initial encounter     Dislocation, jaw    Diverticulitis, colon 04/17/2023    Diverticulosis 04/17/2023    Encounter for immunization 12/10/2015    Need for prophylactic vaccination and inoculation against influenza    Encounter for immunization 12/10/2015    Need for vaccination for pneumococcus    Encounter for screening for malignant neoplasm of colon 10/04/2021    Screening for colon cancer    Hand injury 04/17/2023    Hernia of anterior abdominal wall 04/17/2023    Hyperuricemia 04/17/2023    Long term (current) use of antimetabolite agent 02/04/2015    Long term methotrexate user    Long term (current) use of unspecified immunomodulators and immunosuppressants 07/06/2014    Long-term use of immunosuppressant medication    Personal history of (healed) traumatic fracture     History of fracture of ankle    Personal history of (healed) traumatic fracture     History of fracture of finger    Personal history of diseases of the blood and blood-forming organs and certain disorders involving the immune mechanism     History of anemia    Personal history of diseases of the blood and blood-forming organs and certain disorders involving the immune mechanism     History of factor V Leiden mutation    Personal history of diseases of the skin and subcutaneous tissue     History of rosacea    Personal history of other diseases of the digestive system 06/14/2016    History of colitis    Personal history of other diseases of the musculoskeletal system and connective tissue 04/08/2014    History of osteoarthritis    Personal history of other diseases of the nervous system and sense organs 09/23/2014    History of otitis media    Personal history of other diseases of the respiratory system     History of pleural effusion    Personal history of other diseases of the respiratory system 04/08/2014    History of acute bronchitis    Personal history of pneumonia (recurrent)     History of pneumonia    Rash and other  nonspecific skin eruption 04/10/2014    Rash    Urine frequency 04/17/2023       Surgical History:   Past Surgical History:   Procedure Laterality Date    CARPAL TUNNEL RELEASE  12/10/2015    Neuroplasty Decompression Median Nerve At Carpal Tunnel    OTHER SURGICAL HISTORY  12/10/2015    Wrist Excision Of Ganglion    OTHER SURGICAL HISTORY  02/04/2021    Lung surgery    OTHER SURGICAL HISTORY  02/10/2014    Chest Tube Insertion With Chemical Pleurodesis (Non-chemotherapeutic)    OTHER SURGICAL HISTORY  01/17/2014    Chemosurgery (Mohs Micrographic Technique)    TONSILLECTOMY  12/10/2015    Tonsillectomy With Adenoidectomy        Allergies:   Allergies   Allergen Reactions    Adhesive Tape-Silicones Hives and Rash     form of cephalosporin    Cefuroxime Sodium Hives and Rash     form of cephalosporin    Cefadroxil Unknown    Bacitracin Rash    Cefuroxime Rash     All over body   All over body   All over body    All over body    All over body   All over body    Replaced free text allergyAdded as Codified    Celecoxib Hives and Rash    Retapamulin Rash    Rofecoxib Hives and Rash     VIOXX--HIVES    Replaced free text allergyAdded as Codified    Rosin Rash    Sulfa (Sulfonamide Antibiotics) Hives and Rash     Very sensitive       Medications:   Current Outpatient Medications:     albuterol (ProAir HFA) 90 mcg/actuation inhaler, Inhale., Disp: , Rfl:     betamethasone dipropionate (Diprosone) 0.05 % ointment, Apply topically once daily at bedtime. Apply to rash on hands as directed, Disp: , Rfl:     diphenhydrAMINE-acetaminophen 12.5-325 mg tablet, Take 1 tablet by mouth once daily at bedtime., Disp: , Rfl:     fluticasone (Flonase) 50 mcg/actuation nasal spray, Administer 1 spray into each nostril once daily., Disp: , Rfl:     meloxicam (Mobic) 7.5 mg tablet, Take 1 tablet (7.5 mg) by mouth once daily., Disp: , Rfl:     warfarin (Coumadin) 10 mg tablet, Take 1 tablet (10 mg) by mouth 3 times a week. M, WVÍCTOR, sun  "Take at night., Disp: , Rfl:     warfarin (Coumadin) 5 mg tablet, Take 2.5 tablets (12.5 mg) by mouth. Tues, and thurs TAKE AT NIGHT, Disp: , Rfl:     allopurinol (Zyloprim) 300 mg tablet, Take 1 tablet (300 mg) by mouth once daily., Disp: 90 tablet, Rfl: 1    betamethasone, augmented, (Diprolene) 0.05 % ointment, Apply topically twice a day. Apply to affected area. APPLY TO RASH, Disp: , Rfl:     cyclobenzaprine (Flexeril) 10 mg tablet, Take 1 tablet (10 mg) by mouth 2 times a day as needed for muscle spasms., Disp: , Rfl:     fluticasone (Flovent HFA) 220 mcg/actuation inhaler, Inhale 1 puff 2 times a day as needed., Disp: , Rfl:     meloxicam (Mobic) 7.5 mg tablet, Take 1 tablet (7.5 mg) by mouth., Disp: , Rfl:     triamcinolone (Kenalog) 0.1 % cream, Apply topically 2 times a day. Apply to affected area 1-2 times daily as needed. (Patient not taking: Reported on 5/21/2024), Disp: 15 g, Rfl: 1    warfarin (Coumadin) 2.5 mg tablet, Take 1 tablet (2.5 mg) by mouth once daily.  Take with 10 mg tablet for total daily dose of 12.5 mg., Disp: , Rfl:     warfarin (Coumadin) 5 mg tablet, Take 1 tablet (5 mg) by mouth once daily., Disp: , Rfl:       Objective   Physical Examination:    Visit Vitals  /74 (BP Location: Left arm, Patient Position: Sitting)   Pulse 77   Ht 1.727 m (5' 8\")   Wt (!) 160 kg (352 lb)   BMI 53.52 kg/m²   Smoking Status Every Day   BSA 2.77 m²        Gen: NAD, A&O x 3  HEENT: clear sclera and conjunctiva,     Musculoskeletal:   Neck; WNL, full ROM  Shoulder: WNL, full ROM  Elbow:WNL, full ROM, no effusion noted  Wrist and fingers;no active synovitis noted, Full ROM in the Wrist , Good fist and   Knees:  No effusions or crepitation, full ROM.  Hips; WNL, full ROM, Negative Daniel test  Ankle, Feet; WNL, full ROM    Skin: No rashes or lesions seen, no nail changes  Neuro: A&O x3, Normal Gait    Procedures :None    Orders:  Orders Placed This Encounter   Procedures    Comprehensive " Metabolic Panel    Uric Acid    Vitamin D 25-Hydroxy,Total (for eval of Vitamin D levels)        Provider Impression:   Assessment/Plan   Encounter Diagnoses   Name Primary?    Gouty arthropathy     Primary osteoarthritis involving multiple joints Yes    Vitamin D deficiency        69-year-old female with gout arthropathy and osteoarthritis presents for follow-up.   -Continue with allopurinol 300 mg daily,   -Continue meloxicam 7.5 mg or Tylenol as needed  -Continue with with Vitamin D supplements  -We will check BMP and uric acid levels  -f/u in 6 months

## 2024-05-22 DIAGNOSIS — E55.9 VITAMIN D DEFICIENCY: Primary | ICD-10-CM

## 2024-05-22 RX ORDER — ERGOCALCIFEROL 1.25 MG/1
50000 CAPSULE ORAL
Qty: 12 CAPSULE | Refills: 0 | Status: SHIPPED | OUTPATIENT
Start: 2024-05-22

## 2024-05-22 NOTE — TELEPHONE ENCOUNTER
----- Message from Antonia Santiago DO sent at 5/22/2024 10:30 AM EDT -----  Please let patient know that  vitamin D is low, recommend they take high-dose vitamin D to improve their levels which will help immune system, bones and joints  Send 50,000 units weekly  Q: 12 R:0  When they finish high dose vitamin D to start taking 3000 units daily OTC

## 2024-06-10 ENCOUNTER — APPOINTMENT (OUTPATIENT)
Dept: PRIMARY CARE | Facility: CLINIC | Age: 69
End: 2024-06-10
Payer: MEDICARE

## 2024-08-21 ENCOUNTER — OFFICE VISIT (OUTPATIENT)
Dept: PRIMARY CARE | Facility: CLINIC | Age: 69
End: 2024-08-21
Payer: MEDICARE

## 2024-08-21 VITALS
SYSTOLIC BLOOD PRESSURE: 155 MMHG | BODY MASS INDEX: 44.41 KG/M2 | WEIGHT: 293 LBS | HEART RATE: 85 BPM | OXYGEN SATURATION: 97 % | HEIGHT: 68 IN | TEMPERATURE: 96.9 F | DIASTOLIC BLOOD PRESSURE: 86 MMHG

## 2024-08-21 DIAGNOSIS — H61.92 LESION OF EXTERNAL EAR, LEFT: Primary | ICD-10-CM

## 2024-08-21 DIAGNOSIS — L30.9 DERMATITIS: ICD-10-CM

## 2024-08-21 PROCEDURE — 99213 OFFICE O/P EST LOW 20 MIN: CPT | Performed by: NURSE PRACTITIONER

## 2024-08-21 PROCEDURE — 1160F RVW MEDS BY RX/DR IN RCRD: CPT | Performed by: NURSE PRACTITIONER

## 2024-08-21 PROCEDURE — 3008F BODY MASS INDEX DOCD: CPT | Performed by: NURSE PRACTITIONER

## 2024-08-21 PROCEDURE — 1159F MED LIST DOCD IN RCRD: CPT | Performed by: NURSE PRACTITIONER

## 2024-08-21 RX ORDER — TRIAMCINOLONE ACETONIDE 1 MG/G
CREAM TOPICAL 2 TIMES DAILY
Qty: 80 G | Refills: 1 | Status: SHIPPED | OUTPATIENT
Start: 2024-08-21

## 2024-08-21 RX ORDER — PREDNISONE 20 MG/1
TABLET ORAL
Qty: 20 TABLET | Refills: 0 | Status: SHIPPED | OUTPATIENT
Start: 2024-08-21

## 2024-08-21 RX ORDER — DOXYCYCLINE 100 MG/1
100 CAPSULE ORAL 2 TIMES DAILY
Qty: 20 CAPSULE | Refills: 0 | Status: SHIPPED | OUTPATIENT
Start: 2024-08-21 | End: 2024-08-31

## 2024-08-21 ASSESSMENT — PATIENT HEALTH QUESTIONNAIRE - PHQ9
2. FEELING DOWN, DEPRESSED OR HOPELESS: NOT AT ALL
SUM OF ALL RESPONSES TO PHQ9 QUESTIONS 1 AND 2: 0
1. LITTLE INTEREST OR PLEASURE IN DOING THINGS: NOT AT ALL

## 2024-08-21 ASSESSMENT — ENCOUNTER SYMPTOMS
SHORTNESS OF BREATH: 0
WHEEZING: 0
CONSTITUTIONAL NEGATIVE: 1

## 2024-08-21 NOTE — PATIENT INSTRUCTIONS
Zyrtec or claritin am  Benadryl night    Steroid cream  Steroid pills    Antibiotic for ear    Call if not starting to get better in 2-3d or not fully healed in 2wks.    Return for wellness appt    Check bp at home daily  Goal <140/<90  I will call you in 1wk to obtain bps      I will communicate with you via "[employer]" regarding messages and results. If you need help with this, you can call the support line at [phone].    IT WAS A PLEASURE TO SEE YOU TODAY. THANK YOU FOR CHOOSING US FOR YOUR HEALTHCARE NEEDS.

## 2024-08-21 NOTE — PROGRESS NOTES
Subjective   Patient ID: Becky Ribera is a 69 y.o. female who presents for Rash.  Last physical: due for awv    When did rash start? Saturday   Where is it located? Face, arms, ear      HPI  Rash on lf forehead, forearms (swelling lf forearm), lf chin, lf hand and finger x 4d  Lf ear with crusting top of ear [id number] bacitracin  Upper eyelid swelling bilat lf>rt  Itchy  Not painful  No discharge  No fever or chills  Selftxt: allergy med, ivy dry, bacitracin  No one else around pt with similar rash  No new products, foods, meds, otc meds, or pets    No care team member to display     Review of Systems   Constitutional: Negative.    Respiratory:  Negative for shortness of breath and wheezing.    Cardiovascular:  Negative for chest pain.   Skin:  Positive for rash.       Objective   Visit Vitals  /87   Pulse 85   Temp 36.1 °C (96.9 °F)      BP Readings from Last 3 Encounters:   08/21/24 169/87   05/21/24 137/74   04/28/24 172/82     Wt Readings from Last 3 Encounters:   08/21/24 (!) 158 kg (349 lb)   05/21/24 (!) 160 kg (352 lb)   11/20/23 (!) 154 kg (340 lb)       Physical Exam  Constitutional:       General: She is not in acute distress.     Appearance: Normal appearance.   Skin:     Findings: Rash (red raised rash on forearms, lf hand, lf forehead, lf jaw) present.      Comments: Swelling lf forearm and bilat upper eyelids  Lf top of ear with 7mm x 2cm raw red area with drainage. Unsure if PI or burn   Neurological:      Mental Status: She is alert.       Assessment/Plan   Diagnoses and all orders for this visit:  Lesion of external ear, left  -     doxycycline (Vibramycin) 100 mg capsule; Take 1 capsule (100 mg) by mouth 2 times a day for 10 days. Take with at least 8 ounces (large glass) of water, do not lie down for 30 minutes after  Dermatitis  -     predniSONE (Deltasone) 20 mg tablet; 3 tabs daily x3d then 2 tabs daily x 3d then 1 tab daily x 3d then 1/2 tab daily x 3d with food  -     triamcinolone  (Kenalog) 0.1 % cream; Apply topically 2 times a day. Use no longer than 2wks.  Other orders  -     Follow Up In Primary Care - Medicare Annual; Future

## 2024-08-29 ENCOUNTER — TELEPHONE (OUTPATIENT)
Dept: PRIMARY CARE | Facility: CLINIC | Age: 69
End: 2024-08-29
Payer: MEDICARE

## 2024-10-29 ENCOUNTER — APPOINTMENT (OUTPATIENT)
Dept: PRIMARY CARE | Facility: CLINIC | Age: 69
End: 2024-10-29
Payer: MEDICARE

## 2024-11-14 DIAGNOSIS — M10.9 GOUTY ARTHROPATHY: Primary | ICD-10-CM

## 2024-11-14 RX ORDER — ALLOPURINOL 300 MG/1
300 TABLET ORAL DAILY
Qty: 30 TABLET | Refills: 0 | Status: SHIPPED | OUTPATIENT
Start: 2024-11-14 | End: 2024-12-14

## 2024-11-20 ENCOUNTER — LAB (OUTPATIENT)
Dept: LAB | Facility: LAB | Age: 69
End: 2024-11-20
Payer: MEDICARE

## 2024-11-20 ENCOUNTER — APPOINTMENT (OUTPATIENT)
Dept: RHEUMATOLOGY | Facility: CLINIC | Age: 69
End: 2024-11-20
Payer: MEDICARE

## 2024-11-20 VITALS
HEART RATE: 76 BPM | SYSTOLIC BLOOD PRESSURE: 149 MMHG | DIASTOLIC BLOOD PRESSURE: 81 MMHG | BODY MASS INDEX: 52.72 KG/M2 | WEIGHT: 293 LBS

## 2024-11-20 DIAGNOSIS — M10.9 GOUTY ARTHROPATHY: ICD-10-CM

## 2024-11-20 DIAGNOSIS — E55.9 VITAMIN D DEFICIENCY: ICD-10-CM

## 2024-11-20 DIAGNOSIS — M15.0 PRIMARY OSTEOARTHRITIS INVOLVING MULTIPLE JOINTS: Primary | ICD-10-CM

## 2024-11-20 LAB
25(OH)D3 SERPL-MCNC: 37 NG/ML (ref 30–100)
ALBUMIN SERPL BCP-MCNC: 4.4 G/DL (ref 3.4–5)
ALP SERPL-CCNC: 56 U/L (ref 33–136)
ALT SERPL W P-5'-P-CCNC: 15 U/L (ref 7–45)
AST SERPL W P-5'-P-CCNC: 16 U/L (ref 9–39)
BILIRUB DIRECT SERPL-MCNC: 0.1 MG/DL (ref 0–0.3)
BILIRUB SERPL-MCNC: 0.7 MG/DL (ref 0–1.2)
CREAT SERPL-MCNC: 0.92 MG/DL (ref 0.5–1.05)
EGFRCR SERPLBLD CKD-EPI 2021: 68 ML/MIN/1.73M*2
PROT SERPL-MCNC: 7.1 G/DL (ref 6.4–8.2)
URATE SERPL-MCNC: 5.5 MG/DL (ref 2.3–6.7)

## 2024-11-20 PROCEDURE — 1159F MED LIST DOCD IN RCRD: CPT | Performed by: INTERNAL MEDICINE

## 2024-11-20 PROCEDURE — 82306 VITAMIN D 25 HYDROXY: CPT

## 2024-11-20 PROCEDURE — 84550 ASSAY OF BLOOD/URIC ACID: CPT

## 2024-11-20 PROCEDURE — 99214 OFFICE O/P EST MOD 30 MIN: CPT | Performed by: INTERNAL MEDICINE

## 2024-11-20 PROCEDURE — 82565 ASSAY OF CREATININE: CPT

## 2024-11-20 PROCEDURE — 1160F RVW MEDS BY RX/DR IN RCRD: CPT | Performed by: INTERNAL MEDICINE

## 2024-11-20 PROCEDURE — 36415 COLL VENOUS BLD VENIPUNCTURE: CPT

## 2024-11-20 PROCEDURE — 80076 HEPATIC FUNCTION PANEL: CPT

## 2024-11-20 RX ORDER — ALLOPURINOL 300 MG/1
300 TABLET ORAL DAILY
Qty: 90 TABLET | Refills: 1 | Status: SHIPPED | OUTPATIENT
Start: 2024-11-20 | End: 2025-02-18

## 2024-11-20 NOTE — PROGRESS NOTES
Follow-up Rheumatology Patient Visit    Chief Complaint:  Becky Ribera is a 69 y.o. female presenting today for Follow-up.    History of Presenting Problem:   70 y/o female with Gout present for evaluation. She is on Coumadin due to Factor 5 Leiden, She recalls that she initially though to have a connective tissue disease and then diagnosis with RA. She report she saw 3 different rheumatologist who ended up treating her with Plaquenil and then MTX 3 pills weekly, she report she decided to stop medication as she did not feel the medication did any good on them.  After she establish care here she was diagnosed with gout and has been doing well on this treatment  Today she report her joints have been doing pretty good since being on the allopurinol. She report some joint achiness in her knuckles with weather change.  She takes Tylenol as needed  Problem List:   Patient Active Problem List   Diagnosis    Factor 5 Leiden mutation, heterozygous (Multi)    Prediabetes    Joint pain    Obesity, morbid (Multi)    Fasting hyperglycemia    Elevated blood pressure reading    Recurrent deep venous thrombosis (Multi)    Primary osteoarthritis involving multiple joints    Squamous cell carcinoma of face    Vitamin D deficiency    Venous stasis ulcer of left lower leg with edema of left lower leg    Inflammatory polyarthropathy (Multi)    Chronic ulcer of ankle (Multi)    Gouty arthropathy    Lymphedema of extremity    Post-phlebitic syndrome    Primary localized osteoarthrosis, lower leg    VTE (venous thromboembolism)    Venous stasis dermatitis    Activated protein C resistance (Multi)    Homozygous Factor V Leiden mutation (Multi)    RA (rheumatoid arthritis)    Acute sinusitis       Past Medical History:   Past Medical History:   Diagnosis Date    Abdominal hernia 04/17/2023    Abdominal pain 04/17/2023    Arthropathy 04/17/2023    Cellulitis 04/17/2023    COVID-19 04/17/2023    Dermatitis, eczematoid 04/17/2023     Dislocation of jaw, unspecified side, initial encounter     Dislocation, jaw    Diverticulitis, colon 04/17/2023    Diverticulosis 04/17/2023    Encounter for immunization 12/10/2015    Need for prophylactic vaccination and inoculation against influenza    Encounter for immunization 12/10/2015    Need for vaccination for pneumococcus    Encounter for screening for malignant neoplasm of colon 10/04/2021    Screening for colon cancer    Hand injury 04/17/2023    Hernia of anterior abdominal wall 04/17/2023    Hyperuricemia 04/17/2023    Long term (current) use of antimetabolite agent 02/04/2015    Long term methotrexate user    Long term (current) use of unspecified immunomodulators and immunosuppressants 07/06/2014    Long-term use of immunosuppressant medication    Personal history of (healed) traumatic fracture     History of fracture of ankle    Personal history of (healed) traumatic fracture     History of fracture of finger    Personal history of diseases of the blood and blood-forming organs and certain disorders involving the immune mechanism     History of anemia    Personal history of diseases of the blood and blood-forming organs and certain disorders involving the immune mechanism     History of factor V Leiden mutation    Personal history of diseases of the skin and subcutaneous tissue     History of rosacea    Personal history of other diseases of the digestive system 06/14/2016    History of colitis    Personal history of other diseases of the musculoskeletal system and connective tissue 04/08/2014    History of osteoarthritis    Personal history of other diseases of the nervous system and sense organs 09/23/2014    History of otitis media    Personal history of other diseases of the respiratory system     History of pleural effusion    Personal history of other diseases of the respiratory system 04/08/2014    History of acute bronchitis    Personal history of pneumonia (recurrent)     History of  pneumonia    Rash and other nonspecific skin eruption 04/10/2014    Rash    Urine frequency 04/17/2023       Surgical History:   Past Surgical History:   Procedure Laterality Date    CARPAL TUNNEL RELEASE  12/10/2015    Neuroplasty Decompression Median Nerve At Carpal Tunnel    OTHER SURGICAL HISTORY  12/10/2015    Wrist Excision Of Ganglion    OTHER SURGICAL HISTORY  02/04/2021    Lung surgery    OTHER SURGICAL HISTORY  02/10/2014    Chest Tube Insertion With Chemical Pleurodesis (Non-chemotherapeutic)    OTHER SURGICAL HISTORY  01/17/2014    Chemosurgery (Mohs Micrographic Technique)    TONSILLECTOMY  12/10/2015    Tonsillectomy With Adenoidectomy        Allergies:   Allergies   Allergen Reactions    Adhesive Tape-Silicones Hives and Rash     form of cephalosporin    Cefuroxime Sodium Hives and Rash     form of cephalosporin    Cefadroxil Unknown    Bacitracin Rash    Cefuroxime Rash     All over body   All over body   All over body    All over body    All over body   All over body    Replaced free text allergyAdded as Codified    Celecoxib Hives and Rash    Retapamulin Rash    Rofecoxib Hives and Rash     VIOXX--HIVES    Replaced free text allergyAdded as Codified    Rosin Rash    Sulfa (Sulfonamide Antibiotics) Hives and Rash     Very sensitive       Medications:   Current Outpatient Medications:     albuterol (ProAir HFA) 90 mcg/actuation inhaler, Inhale., Disp: , Rfl:     betamethasone dipropionate (Diprosone) 0.05 % ointment, Apply topically once daily at bedtime. Apply to rash on hands as directed, Disp: , Rfl:     betamethasone, augmented, (Diprolene) 0.05 % ointment, Apply topically twice a day. Apply to affected area. APPLY TO RASH, Disp: , Rfl:     cyclobenzaprine (Flexeril) 10 mg tablet, Take 1 tablet (10 mg) by mouth 2 times a day as needed for muscle spasms., Disp: , Rfl:     diphenhydrAMINE-acetaminophen 12.5-325 mg tablet, Take 1 tablet by mouth once daily at bedtime., Disp: , Rfl:      ergocalciferol (Vitamin D-2) 1.25 MG (33142 UT) capsule, Take 1 capsule (50,000 Units) by mouth 1 (one) time per week., Disp: 12 capsule, Rfl: 0    fluticasone (Flonase) 50 mcg/actuation nasal spray, Administer 1 spray into each nostril once daily., Disp: , Rfl:     fluticasone (Flovent HFA) 220 mcg/actuation inhaler, Inhale 1 puff 2 times a day as needed., Disp: , Rfl:     meloxicam (Mobic) 7.5 mg tablet, Take 1 tablet (7.5 mg) by mouth once daily., Disp: , Rfl:     meloxicam (Mobic) 7.5 mg tablet, Take 1 tablet (7.5 mg) by mouth., Disp: , Rfl:     predniSONE (Deltasone) 20 mg tablet, 3 tabs daily x3d then 2 tabs daily x 3d then 1 tab daily x 3d then 1/2 tab daily x 3d with food, Disp: 20 tablet, Rfl: 0    triamcinolone (Kenalog) 0.1 % cream, Apply topically 2 times a day. Use no longer than 2wks., Disp: 80 g, Rfl: 1    warfarin (Coumadin) 10 mg tablet, Take 1 tablet (10 mg) by mouth 3 times a week. M, W, F. Sat, sun Take at night., Disp: , Rfl:     warfarin (Coumadin) 2.5 mg tablet, Take 1 tablet (2.5 mg) by mouth once daily.  Take with 10 mg tablet for total daily dose of 12.5 mg., Disp: , Rfl:     warfarin (Coumadin) 5 mg tablet, Take 1 tablet (5 mg) by mouth once daily., Disp: , Rfl:     warfarin (Coumadin) 5 mg tablet, Take 2.5 tablets (12.5 mg) by mouth. Tues, and thurs TAKE AT NIGHT, Disp: , Rfl:     allopurinol (Zyloprim) 300 mg tablet, Take 1 tablet (300 mg) by mouth once daily., Disp: 90 tablet, Rfl: 1      Objective   Physical Examination:    Visit Vitals  /81   Pulse 76   Wt (!) 157 kg (346 lb 11.2 oz)   BMI 52.72 kg/m²   Smoking Status Every Day   BSA 2.74 m²        Gen: NAD, A&O x 3  HEENT: clear sclera and conjunctiva,     Musculoskeletal:   Neck; WNL, full ROM  Shoulder: WNL, full ROM  Elbow:WNL, full ROM, no effusion noted  Wrist and fingers;no active synovitis noted, Full ROM in the Wrist , Good fist and   Knees:  No effusions or crepitation, full ROM.  Hips; WNL, full ROM, Negative  Daniel test  Ankle, Feet; WNL, full ROM    Skin: No rashes or lesions seen, no nail changes  Neuro: A&O x3, Normal Gait    Procedures :None    Orders:  Orders Placed This Encounter   Procedures    Creatinine    Hepatic Function Panel    Uric Acid    Vitamin D 25-Hydroxy,Total (for eval of Vitamin D levels)        Provider Impression:   Assessment/Plan   Encounter Diagnoses   Name Primary?    Gouty arthropathy     Primary osteoarthritis involving multiple joints Yes    Vitamin D deficiency        69-year-old female with gout arthropathy and osteoarthritis presents for follow-up.   -Continue with allopurinol 300 mg daily,   -Continue Tylenol as needed, can also use volteran gel as needed  -Continue with with Vitamin D supplements  -We will check BMP and uric acid levels  -f/u in 6 months

## 2025-05-20 ENCOUNTER — APPOINTMENT (OUTPATIENT)
Dept: RHEUMATOLOGY | Facility: CLINIC | Age: 70
End: 2025-05-20
Payer: MEDICARE

## 2025-05-20 VITALS
DIASTOLIC BLOOD PRESSURE: 86 MMHG | WEIGHT: 293 LBS | OXYGEN SATURATION: 96 % | BODY MASS INDEX: 50.99 KG/M2 | HEART RATE: 71 BPM | SYSTOLIC BLOOD PRESSURE: 158 MMHG

## 2025-05-20 DIAGNOSIS — M15.0 PRIMARY OSTEOARTHRITIS INVOLVING MULTIPLE JOINTS: ICD-10-CM

## 2025-05-20 DIAGNOSIS — E55.9 VITAMIN D DEFICIENCY: ICD-10-CM

## 2025-05-20 DIAGNOSIS — M10.9 GOUTY ARTHROPATHY: Primary | ICD-10-CM

## 2025-05-20 LAB
25(OH)D3+25(OH)D2 SERPL-MCNC: 40 NG/ML (ref 30–100)
ALBUMIN SERPL-MCNC: 4.4 G/DL (ref 3.6–5.1)
ALBUMIN/GLOB SERPL: 1.5 (CALC) (ref 1–2.5)
ALP SERPL-CCNC: 53 U/L (ref 37–153)
ALT SERPL-CCNC: 18 U/L (ref 6–29)
AST SERPL-CCNC: 20 U/L (ref 10–35)
BILIRUB DIRECT SERPL-MCNC: 0.1 MG/DL
BILIRUB INDIRECT SERPL-MCNC: 0.5 MG/DL (CALC) (ref 0.2–1.2)
BILIRUB SERPL-MCNC: 0.6 MG/DL (ref 0.2–1.2)
CREAT SERPL-MCNC: 0.81 MG/DL (ref 0.5–1.05)
EGFRCR SERPLBLD CKD-EPI 2021: 79 ML/MIN/1.73M2
GLOBULIN SER CALC-MCNC: 3 G/DL (CALC) (ref 1.9–3.7)
PROT SERPL-MCNC: 7.4 G/DL (ref 6.1–8.1)
URATE SERPL-MCNC: 5.1 MG/DL (ref 2.5–7)

## 2025-05-20 PROCEDURE — 99213 OFFICE O/P EST LOW 20 MIN: CPT | Performed by: INTERNAL MEDICINE

## 2025-05-20 PROCEDURE — 1160F RVW MEDS BY RX/DR IN RCRD: CPT | Performed by: INTERNAL MEDICINE

## 2025-05-20 PROCEDURE — 1159F MED LIST DOCD IN RCRD: CPT | Performed by: INTERNAL MEDICINE

## 2025-05-20 ASSESSMENT — ENCOUNTER SYMPTOMS
LOSS OF SENSATION IN FEET: 0
OCCASIONAL FEELINGS OF UNSTEADINESS: 0
DEPRESSION: 0

## 2025-05-20 NOTE — PROGRESS NOTES
Follow-up Rheumatology Patient Visit    Chief Complaint:  Becky Ribera is a 70 y.o. female presenting today for Follow-up and Med Refill (allp).    History of Presenting Problem:   71 y/o female with Gout present for evaluation. She is on Coumadin due to Factor 5 Leiden, She recalls that she initially though to have a connective tissue disease and then diagnosis with RA. She report she saw 3 different rheumatologist who ended up treating her with Plaquenil and then MTX 3 pills weekly, she report she decided to stop medication as she did not feel the medication did any good on them.  After she establish care here she was diagnosed with gout and has been doing well on this treatment  Today she report her joints have been doing pretty good since being on the allopurinol. She deal with right knee pain, knows she may need joint replacement. She takes Tylenol as needed    Problem List:   Patient Active Problem List   Diagnosis    Factor 5 Leiden mutation, heterozygous (Multi)    Prediabetes    Joint pain    Obesity, morbid (Multi)    Fasting hyperglycemia    Elevated blood pressure reading    Recurrent deep venous thrombosis (Multi)    Primary osteoarthritis involving multiple joints    Squamous cell carcinoma of face    Vitamin D deficiency    Venous stasis ulcer of left lower leg with edema of left lower leg    Inflammatory polyarthropathy (Multi)    Chronic ulcer of ankle (Multi)    Gouty arthropathy    Lymphedema of extremity    Post-phlebitic syndrome    Primary localized osteoarthrosis, lower leg    VTE (venous thromboembolism)    Venous stasis dermatitis    Activated protein C resistance (Multi)    Homozygous Factor V Leiden mutation (Multi)    RA (rheumatoid arthritis)    Acute sinusitis       Past Medical History:   Past Medical History:   Diagnosis Date    Abdominal hernia 04/17/2023    Abdominal pain 04/17/2023    Arthropathy 04/17/2023    Cellulitis 04/17/2023    COVID-19 04/17/2023    Dermatitis,  eczematoid 04/17/2023    Dislocation of jaw, unspecified side, initial encounter     Dislocation, jaw    Diverticulitis, colon 04/17/2023    Diverticulosis 04/17/2023    Encounter for immunization 12/10/2015    Need for prophylactic vaccination and inoculation against influenza    Encounter for immunization 12/10/2015    Need for vaccination for pneumococcus    Encounter for screening for malignant neoplasm of colon 10/04/2021    Screening for colon cancer    Hand injury 04/17/2023    Hernia of anterior abdominal wall 04/17/2023    Hyperuricemia 04/17/2023    Long term (current) use of antimetabolite agent 02/04/2015    Long term methotrexate user    Long term (current) use of unspecified immunomodulators and immunosuppressants 07/06/2014    Long-term use of immunosuppressant medication    Personal history of (healed) traumatic fracture     History of fracture of ankle    Personal history of (healed) traumatic fracture     History of fracture of finger    Personal history of diseases of the blood and blood-forming organs and certain disorders involving the immune mechanism     History of anemia    Personal history of diseases of the blood and blood-forming organs and certain disorders involving the immune mechanism     History of factor V Leiden mutation    Personal history of diseases of the skin and subcutaneous tissue     History of rosacea    Personal history of other diseases of the digestive system 06/14/2016    History of colitis    Personal history of other diseases of the musculoskeletal system and connective tissue 04/08/2014    History of osteoarthritis    Personal history of other diseases of the nervous system and sense organs 09/23/2014    History of otitis media    Personal history of other diseases of the respiratory system     History of pleural effusion    Personal history of other diseases of the respiratory system 04/08/2014    History of acute bronchitis    Personal history of pneumonia  (recurrent)     History of pneumonia    Rash and other nonspecific skin eruption 04/10/2014    Rash    Urine frequency 04/17/2023       Surgical History:   Past Surgical History:   Procedure Laterality Date    CARPAL TUNNEL RELEASE  12/10/2015    Neuroplasty Decompression Median Nerve At Carpal Tunnel    OTHER SURGICAL HISTORY  12/10/2015    Wrist Excision Of Ganglion    OTHER SURGICAL HISTORY  02/04/2021    Lung surgery    OTHER SURGICAL HISTORY  02/10/2014    Chest Tube Insertion With Chemical Pleurodesis (Non-chemotherapeutic)    OTHER SURGICAL HISTORY  01/17/2014    Chemosurgery (Mohs Micrographic Technique)    TONSILLECTOMY  12/10/2015    Tonsillectomy With Adenoidectomy        Allergies:   Allergies   Allergen Reactions    Adhesive Tape-Silicones Hives and Rash     form of cephalosporin    Cefuroxime Sodium Hives and Rash     form of cephalosporin    Cefadroxil Unknown    Bacitracin Rash    Cefuroxime Rash     All over body   All over body   All over body    All over body    All over body   All over body    Replaced free text allergyAdded as Codified    Celecoxib Hives and Rash    Retapamulin Rash    Rofecoxib Hives and Rash     VIOXX--HIVES    Replaced free text allergyAdded as Codified    Rosin Rash    Sulfa (Sulfonamide Antibiotics) Hives and Rash     Very sensitive       Medications:   Current Outpatient Medications:     albuterol (ProAir HFA) 90 mcg/actuation inhaler, Inhale., Disp: , Rfl:     cyclobenzaprine (Flexeril) 10 mg tablet, Take 1 tablet (10 mg) by mouth 2 times a day as needed for muscle spasms., Disp: , Rfl:     diphenhydrAMINE-acetaminophen 12.5-325 mg tablet, Take 1 tablet by mouth once daily at bedtime., Disp: , Rfl:     ergocalciferol (Vitamin D-2) 1.25 MG (75299 UT) capsule, Take 1 capsule (50,000 Units) by mouth 1 (one) time per week., Disp: 12 capsule, Rfl: 0    fluticasone (Flovent HFA) 220 mcg/actuation inhaler, Inhale 1 puff 2 times a day as needed., Disp: , Rfl:     predniSONE  (Deltasone) 20 mg tablet, 3 tabs daily x3d then 2 tabs daily x 3d then 1 tab daily x 3d then 1/2 tab daily x 3d with food, Disp: 20 tablet, Rfl: 0    warfarin (Coumadin) 10 mg tablet, Take 1 tablet (10 mg) by mouth 3 times a week. M, W, F. Sat, sun Take at night., Disp: , Rfl:     warfarin (Coumadin) 2.5 mg tablet, Take 1 tablet (2.5 mg) by mouth once daily.  Take with 10 mg tablet for total daily dose of 12.5 mg., Disp: , Rfl:     warfarin (Coumadin) 5 mg tablet, Take 1 tablet (5 mg) by mouth once daily., Disp: , Rfl:     warfarin (Coumadin) 5 mg tablet, Take 2.5 tablets (12.5 mg) by mouth. Tues, and thurs TAKE AT NIGHT, Disp: , Rfl:     betamethasone dipropionate (Diprosone) 0.05 % ointment, Apply topically once daily at bedtime. Apply to rash on hands as directed (Patient not taking: Reported on 5/20/2025), Disp: , Rfl:     betamethasone, augmented, (Diprolene) 0.05 % ointment, Apply topically twice a day. Apply to affected area. APPLY TO RASH (Patient not taking: Reported on 5/20/2025), Disp: , Rfl:     fluticasone (Flonase) 50 mcg/actuation nasal spray, Administer 1 spray into each nostril once daily. (Patient not taking: Reported on 5/20/2025), Disp: , Rfl:     triamcinolone (Kenalog) 0.1 % cream, Apply topically 2 times a day. Use no longer than 2wks. (Patient not taking: Reported on 5/20/2025), Disp: 80 g, Rfl: 1      Objective   Physical Examination:    Visit Vitals  /86 (BP Location: Left arm, Patient Position: Sitting)   Pulse 71   Wt (!) 152 kg (335 lb 6 oz)   SpO2 96%   BMI 50.99 kg/m²   Smoking Status Every Day   BSA 2.7 m²        Gen: NAD, A&O x 3  HEENT: clear sclera and conjunctiva,     Musculoskeletal:   Neck; WNL, full ROM  Shoulder: WNL, full ROM  Elbow:WNL, full ROM, no effusion noted  Wrist and fingers;no active synovitis noted, Full ROM in the Wrist , Good fist and   Knees:  No effusions or crepitation, full ROM.  Hips; WNL, full ROM, Negative Daniel test  Ankle, Feet; WNL, full  ROM    Skin: No rashes or lesions seen, no nail changes  Neuro: A&O x3, Normal Gait    Procedures :None    Orders:  Orders Placed This Encounter   Procedures    Uric Acid    Hepatic Function Panel    Creatinine    Vitamin D 25-Hydroxy,Total (for eval of Vitamin D levels)        Provider Impression:   Assessment/Plan   Encounter Diagnoses   Name Primary?    Gouty arthropathy Yes    Primary osteoarthritis involving multiple joints     Vitamin D deficiency        70-year-old female with gout arthropathy and osteoarthritis presents for follow-up.   -Continue with allopurinol 300 mg daily,   -Continue Tylenol as needed, can also use volteran gel as needed  -Continue with with Vitamin D supplements  -We will check BMP and uric acid levels  -f/u in 6 months

## 2025-07-15 DIAGNOSIS — M10.9 GOUTY ARTHROPATHY: Primary | ICD-10-CM

## 2025-07-15 RX ORDER — ALLOPURINOL 300 MG/1
300 TABLET ORAL DAILY
Qty: 90 TABLET | Refills: 0 | Status: SHIPPED | OUTPATIENT
Start: 2025-07-15 | End: 2025-10-13

## 2025-08-05 ENCOUNTER — PATIENT OUTREACH (OUTPATIENT)
Dept: CARE COORDINATION | Facility: CLINIC | Age: 70
End: 2025-08-05
Payer: MEDICARE

## 2025-08-05 DIAGNOSIS — Z12.31 ENCOUNTER FOR SCREENING MAMMOGRAM FOR BREAST CANCER: ICD-10-CM

## 2025-10-07 ENCOUNTER — APPOINTMENT (OUTPATIENT)
Dept: PRIMARY CARE | Facility: CLINIC | Age: 70
End: 2025-10-07
Payer: MEDICARE

## 2025-12-02 ENCOUNTER — APPOINTMENT (OUTPATIENT)
Dept: RHEUMATOLOGY | Facility: CLINIC | Age: 70
End: 2025-12-02
Payer: MEDICARE